# Patient Record
Sex: FEMALE | Race: OTHER | HISPANIC OR LATINO | URBAN - METROPOLITAN AREA
[De-identification: names, ages, dates, MRNs, and addresses within clinical notes are randomized per-mention and may not be internally consistent; named-entity substitution may affect disease eponyms.]

---

## 2020-01-05 ENCOUNTER — EMERGENCY (EMERGENCY)
Facility: HOSPITAL | Age: 65
LOS: 1 days | Discharge: HOME | End: 2020-01-05
Attending: EMERGENCY MEDICINE
Payer: SELF-PAY

## 2020-01-05 VITALS
SYSTOLIC BLOOD PRESSURE: 182 MMHG | OXYGEN SATURATION: 99 % | RESPIRATION RATE: 20 BRPM | DIASTOLIC BLOOD PRESSURE: 121 MMHG | TEMPERATURE: 98 F | HEART RATE: 97 BPM

## 2020-01-05 LAB
ANION GAP SERPL CALC-SCNC: 14 MMOL/L — SIGNIFICANT CHANGE UP (ref 5–17)
BUN SERPL-MCNC: 14 MG/DL — SIGNIFICANT CHANGE UP (ref 8–20)
CALCIUM SERPL-MCNC: 9.4 MG/DL — SIGNIFICANT CHANGE UP (ref 8.6–10.2)
CHLORIDE SERPL-SCNC: 101 MMOL/L — SIGNIFICANT CHANGE UP (ref 98–107)
CO2 SERPL-SCNC: 26 MMOL/L — SIGNIFICANT CHANGE UP (ref 22–29)
CREAT SERPL-MCNC: 0.83 MG/DL — SIGNIFICANT CHANGE UP (ref 0.5–1.3)
GLUCOSE SERPL-MCNC: 130 MG/DL — HIGH (ref 70–115)
HCT VFR BLD CALC: 40.8 % — SIGNIFICANT CHANGE UP (ref 34.5–45)
HGB BLD-MCNC: 13.3 G/DL — SIGNIFICANT CHANGE UP (ref 11.5–15.5)
MCHC RBC-ENTMCNC: 29.4 PG — SIGNIFICANT CHANGE UP (ref 27–34)
MCHC RBC-ENTMCNC: 32.6 GM/DL — SIGNIFICANT CHANGE UP (ref 32–36)
MCV RBC AUTO: 90.1 FL — SIGNIFICANT CHANGE UP (ref 80–100)
PLATELET # BLD AUTO: 257 K/UL — SIGNIFICANT CHANGE UP (ref 150–400)
POTASSIUM SERPL-MCNC: 3.4 MMOL/L — LOW (ref 3.5–5.3)
POTASSIUM SERPL-SCNC: 3.4 MMOL/L — LOW (ref 3.5–5.3)
RBC # BLD: 4.53 M/UL — SIGNIFICANT CHANGE UP (ref 3.8–5.2)
RBC # FLD: 12 % — SIGNIFICANT CHANGE UP (ref 10.3–14.5)
SODIUM SERPL-SCNC: 141 MMOL/L — SIGNIFICANT CHANGE UP (ref 135–145)
T3 SERPL-MCNC: 111 NG/DL — SIGNIFICANT CHANGE UP (ref 80–200)
T4 AB SER-ACNC: 6.1 UG/DL — SIGNIFICANT CHANGE UP (ref 4.5–12)
TROPONIN T SERPL-MCNC: <0.01 NG/ML — SIGNIFICANT CHANGE UP (ref 0–0.06)
TSH SERPL-MCNC: 2.75 UIU/ML — SIGNIFICANT CHANGE UP (ref 0.27–4.2)
WBC # BLD: 6.53 K/UL — SIGNIFICANT CHANGE UP (ref 3.8–10.5)
WBC # FLD AUTO: 6.53 K/UL — SIGNIFICANT CHANGE UP (ref 3.8–10.5)

## 2020-01-05 PROCEDURE — 74174 CTA ABD&PLVS W/CONTRAST: CPT | Mod: 26

## 2020-01-05 PROCEDURE — 93010 ELECTROCARDIOGRAM REPORT: CPT

## 2020-01-05 PROCEDURE — 99218: CPT

## 2020-01-05 PROCEDURE — 71275 CT ANGIOGRAPHY CHEST: CPT | Mod: 26

## 2020-01-05 PROCEDURE — 93010 ELECTROCARDIOGRAM REPORT: CPT | Mod: 77

## 2020-01-05 PROCEDURE — 71046 X-RAY EXAM CHEST 2 VIEWS: CPT | Mod: 26

## 2020-01-05 RX ORDER — IBUPROFEN 200 MG
600 TABLET ORAL ONCE
Refills: 0 | Status: COMPLETED | OUTPATIENT
Start: 2020-01-05 | End: 2020-01-05

## 2020-01-05 RX ORDER — AMLODIPINE BESYLATE 2.5 MG/1
10 TABLET ORAL ONCE
Refills: 0 | Status: COMPLETED | OUTPATIENT
Start: 2020-01-05 | End: 2020-01-05

## 2020-01-05 RX ADMIN — AMLODIPINE BESYLATE 10 MILLIGRAM(S): 2.5 TABLET ORAL at 15:44

## 2020-01-05 RX ADMIN — Medication 600 MILLIGRAM(S): at 18:15

## 2020-01-05 RX ADMIN — Medication 600 MILLIGRAM(S): at 15:44

## 2020-01-05 NOTE — ED PROVIDER NOTE - CLINICAL SUMMARY MEDICAL DECISION MAKING FREE TEXT BOX
Pt with palpitations and HTN. Has had symptoms multiple times in past. Currently not seeing cardiology due to insurance issue. Plan for labs, trop, cxr, ecg, amlodipine, reassess. Consider SS cards consult. Pt with palpitations and HTN. Has had symptoms multiple times in past. Currently not seeing cardiology due to insurance issue. Plan for labs, trop, cxr, ecg, amlodipine, reassess. Consider CDU

## 2020-01-05 NOTE — ED CDU PROVIDER INITIAL DAY NOTE - OBJECTIVE STATEMENT
64F h/o HTN (unknown meds) p/w palpitations since last night, constant. Was told in EMS that she was hypertensive. Has been having palpitations and HTN x 3 years, states she had negative workups. Negative echo a year ago, no stress test. Denies CP, SOB, travel, leg pain, leg swelling, urinary symptoms, difficulty urinating. Is compliant with medications.

## 2020-01-05 NOTE — PROGRESS NOTE ADULT - SUBJECTIVE AND OBJECTIVE BOX
Advised CDU team to obtain TTE. Continue norvasc. If needed for blood pressure control, add lisinopril 10 mg.   If normal, dc tomorrow.

## 2020-01-05 NOTE — ED CDU PROVIDER INITIAL DAY NOTE - NS ED ROS FT
No fever/chills, No photophobia/eye pain/changes in vision, No ear pain/sore throat/dysphagia, +chest pain/+palpitations, no SOB/cough/wheeze/stridor, No abdominal pain, No N/V/D, no dysuria/frequency/discharge, No neck/back pain, no rash, no changes in neurological status/function.

## 2020-01-05 NOTE — ED PROVIDER NOTE - OBJECTIVE STATEMENT
Translation provided by ED : Nette Sher  64F h/o HTN (unknown meds) p/w palpitations since last night, constant. Was told in EMS that she was hypertensive. Has been having palpitations and HTN x 3 years, states she had negative workups. Negative echo a year ago, no stress test. Denies CP, SOB, travel, leg pain, leg swelling, urinary symptoms, difficulty urinating. Is compliant with medications.

## 2020-01-05 NOTE — ED PROVIDER NOTE - PHYSICAL EXAMINATION
Gen: Well appearing in NAD  Head: NC/AT  Neck: trachea midline  Resp:  No distress, CTAB  CV: RRR, no edema  GI: soft, NTND  Ext: no deformities, no calf ttp  Neuro:  A&O appears non focal  Skin:  Warm and dry as visualized  Psych:  Normal affect and mood

## 2020-01-05 NOTE — ED CDU PROVIDER INITIAL DAY NOTE - ATTENDING CONTRIBUTION TO CARE
I, Shahnaz Mejia, performed a face to face bedside interview with this patient regarding history of present illness, review of symptoms and relevant past medical, social and family history.  I completed an independent physical examination. Medical decision making, follow-up on ordered tests (ie labs, radiologic studies) and re-evaluation of the patient's status has been communicated to the ACP.  Disposition of the patient will be based on test outcome and response to ED interventions.     Pt with h/o HTN, unsure medication, came in today with palpitations, no CP/SOB. no LE edema. BP controlled with PO norvasc, CXR with tortuous aorta/widened mediastinum, with severe HTN CTA ordered for dissection though patient neuro intact/normal vascular exam, low suspicion. to be seen by cards in AM. serial trops and echo.

## 2020-01-05 NOTE — ED CDU PROVIDER INITIAL DAY NOTE - PROGRESS NOTE DETAILS
Given risk factors age, and HTN and patient has no medical insurance consulted [facility] cardiology.  CXR reviewed given prominent aortic knob will obtain CTA to evaluate for dissection spoke to cardiology Daysi reccomends echo and possible second agent to control BP ACE or ARB (10 of Lisinopril). case d/w PA MILLWORM to f/u CTA results Pt seen resting comfortable in no acute distress in bed, no acute complaints will follow CDU initial intake orders observe for change in pt condition, results and or consults. Pt seen resting comfortable in no acute distress in bed, no acute complaints will follow CDU initial intake orders observe for change in pt condition, results and or consults.  PT with BP lowered approximately 20-30 percent will hold second agent and await further testing.

## 2020-01-05 NOTE — ED CDU PROVIDER INITIAL DAY NOTE - MEDICAL DECISION MAKING DETAILS
palpitations/elevated BP: heart score 3, will place in observation for cardiac pathway, serial troponin,  will add on thyroid pannel, EKG, CTA to eval for dissection, cardiology evaluation, re-assess

## 2020-01-06 VITALS
DIASTOLIC BLOOD PRESSURE: 84 MMHG | OXYGEN SATURATION: 96 % | HEART RATE: 65 BPM | SYSTOLIC BLOOD PRESSURE: 135 MMHG | TEMPERATURE: 98 F | RESPIRATION RATE: 16 BRPM

## 2020-01-06 PROCEDURE — 93005 ELECTROCARDIOGRAM TRACING: CPT

## 2020-01-06 PROCEDURE — 74174 CTA ABD&PLVS W/CONTRAST: CPT

## 2020-01-06 PROCEDURE — 36415 COLL VENOUS BLD VENIPUNCTURE: CPT

## 2020-01-06 PROCEDURE — 99217: CPT

## 2020-01-06 PROCEDURE — 84436 ASSAY OF TOTAL THYROXINE: CPT

## 2020-01-06 PROCEDURE — 80048 BASIC METABOLIC PNL TOTAL CA: CPT

## 2020-01-06 PROCEDURE — G0378: CPT

## 2020-01-06 PROCEDURE — 99284 EMERGENCY DEPT VISIT MOD MDM: CPT | Mod: 25

## 2020-01-06 PROCEDURE — 71275 CT ANGIOGRAPHY CHEST: CPT

## 2020-01-06 PROCEDURE — T1013: CPT

## 2020-01-06 PROCEDURE — 93306 TTE W/DOPPLER COMPLETE: CPT | Mod: 26

## 2020-01-06 PROCEDURE — 70450 CT HEAD/BRAIN W/O DYE: CPT | Mod: 26

## 2020-01-06 PROCEDURE — 84480 ASSAY TRIIODOTHYRONINE (T3): CPT

## 2020-01-06 PROCEDURE — 84443 ASSAY THYROID STIM HORMONE: CPT

## 2020-01-06 PROCEDURE — 70450 CT HEAD/BRAIN W/O DYE: CPT

## 2020-01-06 PROCEDURE — 85027 COMPLETE CBC AUTOMATED: CPT

## 2020-01-06 PROCEDURE — 93306 TTE W/DOPPLER COMPLETE: CPT

## 2020-01-06 PROCEDURE — 71046 X-RAY EXAM CHEST 2 VIEWS: CPT

## 2020-01-06 PROCEDURE — 84484 ASSAY OF TROPONIN QUANT: CPT

## 2020-01-06 RX ORDER — AMLODIPINE BESYLATE 2.5 MG/1
10 TABLET ORAL ONCE
Refills: 0 | Status: COMPLETED | OUTPATIENT
Start: 2020-01-06 | End: 2020-01-06

## 2020-01-06 RX ORDER — AMLODIPINE BESYLATE 2.5 MG/1
1 TABLET ORAL
Qty: 14 | Refills: 0
Start: 2020-01-06 | End: 2020-01-19

## 2020-01-06 RX ORDER — ACETAMINOPHEN 500 MG
650 TABLET ORAL ONCE
Refills: 0 | Status: COMPLETED | OUTPATIENT
Start: 2020-01-06 | End: 2020-01-06

## 2020-01-06 RX ADMIN — Medication 650 MILLIGRAM(S): at 07:22

## 2020-01-06 RX ADMIN — AMLODIPINE BESYLATE 10 MILLIGRAM(S): 2.5 TABLET ORAL at 11:40

## 2020-01-06 RX ADMIN — Medication 650 MILLIGRAM(S): at 06:39

## 2020-01-06 NOTE — ED ADULT NURSE REASSESSMENT NOTE - GENERAL PATIENT STATE
family/SO at bedside/comfortable appearance/cooperative/resting/sleeping/smiling/interactive
comfortable appearance/resting/sleeping/smiling/interactive/cooperative
smiling/interactive/cooperative/family/SO at bedside/comfortable appearance/resting/sleeping

## 2020-01-06 NOTE — ED CDU PROVIDER SUBSEQUENT DAY NOTE - PROGRESS NOTE DETAILS
Pt received from MARI Shanks, pending TTE. Pt evaluated at bedside, reports no headache at this time. States she had headache in morning, now resolved. No palpitations or chest pain. Will order CT head given HTN and headache, TTE pending. Pt received from MARI Shanks, pending TTE. Pt evaluated at bedside with Dr. Givens, reports no headache at this time. States she had headache in morning, now resolved. No palpitations or chest pain. Will order CT head given HTN and headache, TTE pending. Spoke to pharmacy, pt only on HCTZ 25mg, d/w Dr. Givens, will send Rx Norvasc 10mg QD. MARI Spencer NOTE: Reviewed all results with Dr. Givens, will send Rx Norvasc 10mg QD, outpatient follow up at Neurosurgery and Cardioogy. Case management to arrange [facility]/Cards appt. Pt stable for d/c, reports no HA/CP/SOB. VSS, tolerating PO, ambulatory.  Discussion includes results, plan, proper medication use/side effects, and return precautions. Pt advised to f/u with PMD 1-2 days and specialists discussed.  Discussed all incidental findings and importance of outpatient follow up. Pt given printed copies of lab/radiology for outpt follow up. Pt verbalized understanding/agreement of plan. ED  Mily.

## 2020-01-06 NOTE — ED CDU PROVIDER DISPOSITION NOTE - NSFOLLOWUPCLINICS_GEN_ALL_ED_FT
[facility]  Cardiology  [address] [place] [address]  Phone: [phone]  Fax:   Follow Up Time: 7-10 Days

## 2020-01-06 NOTE — ED CDU PROVIDER DISPOSITION NOTE - CLINICAL COURSE
64F h/o HTN (unknown meds) p/w palpitations, HTN and headache. Had troponin negative x 3. Seen by Cardiology, recommending TTE, was WNL. Cardiology recommending adding Norvasc 10mg QD for BP control, pt is only on HCTZ 25mg at home. CT head reveals Mild cerebellar tonsillar ectopia, pt HA now resolved, will advise outpatient Neurosurgery follow up. Follow up outpatient with both Neurosurgery and Cardiology.

## 2020-01-06 NOTE — PROVIDER CONTACT NOTE (OTHER) - ASSESSMENT
Patient remained on observation in the CDU and was discharged to home today. Patient works full time in a factory. Patient lives with supportive friends and also has the support of family members . Patient's aby Amin at patient bedside interpreting Kittitian/ English at patient request. Patient declines interpretor services.  Patient is interested in medical follow up at the [facility] . Case Management office notified. Appointment secured for January 9, 2020 @ 1:30 PM with Dr Chikis Pacheco. Patient will also need to follow up with [facility]. Appointment at the Cardiology clinic has been scheduled for January 15,2020 @ 10:00 AM ( [phone]). Patient / niece Quinnexis have been given this information with verbal understanding. Patient's bay Morin will transport patient home.

## 2020-01-06 NOTE — DISCHARGE NOTE NURSING/CASE MANAGEMENT/SOCIAL WORK - PATIENT PORTAL LINK FT
You can access the FollowMyHealth Patient Portal offered by [facility] by registering at the following website: [email or web address]. By joining [employer]’s FollowMyHealth portal, you will also be able to view your health information using other applications (apps) compatible with our system.

## 2020-01-06 NOTE — DISCHARGE NOTE NURSING/CASE MANAGEMENT/SOCIAL WORK - NSDCFUADDAPPT_GEN_ALL_CORE_FT
[facility] Appointment secured for January 9, 2020 @ 1:30 PM with Dr Chikis Pacheco. Patient also has a follow up appt with [facility]. Appointment at the Cardiology clinic has been scheduled for January 15,2020 @ 10:00 AM ( [phone]). Patient / niece Quinnexis have been given this information with verbal understanding.

## 2020-01-06 NOTE — ED CDU PROVIDER SUBSEQUENT DAY NOTE - HISTORY
PT with Hypertensive urgency placed in obs for ECHO, CARDs consult, telemetry over night, BP controlled, pt with no acute incidents over night will await further testing and results.

## 2020-01-06 NOTE — ED CDU PROVIDER SUBSEQUENT DAY NOTE - ATTENDING CONTRIBUTION TO CARE
I personally saw the patient with the PA, and completed the key components of the history and physical exam. I then discussed the management plan with the PA.    Pt feeling well this morning;  reports intermittent mild frontal headaches/ informed of CTh results and echo pending.

## 2020-01-06 NOTE — ED ADULT NURSE REASSESSMENT NOTE - NS ED NURSE REASSESS COMMENT FT1
Endorsed pt care from  previous RN Armando العلي. Pt off unit at CT.
Pt alert and oriented. resting in stretcher, no signs of distress noted. Handoff report given to Armando العلي RN. pt's safety maintained. RN with no questions or concerns at this time
Received report from CALI Bal. Pt to be moved to CDU for observation.
pt ambulated to/from bathroom with steady gait. now with complaints of headache. denies any vision changes. MARI Shanks made aware, awaiting orders at this time.
pt   predominately Angolan speaking. ED language services offered. pt decline, requesting family to translate. A&OX4, resting in stretcher, denies any c/o pain/discomfort. no signs of apparent distress noted at this time. awaiting CT results/ TTE echo.  Plan of care reviewed, pt verbalizes (+) understanding.  bed in lowest position, call bell within reach and safety maintained

## 2020-01-06 NOTE — ED ADULT NURSE REASSESSMENT NOTE - COMFORT CARE
wait time explained/plan of care explained/ambulated to bathroom
meal provided/po fluids offered/wait time explained/ambulated to bathroom/plan of care explained
plan of care explained

## 2020-01-06 NOTE — ED CDU PROVIDER DISPOSITION NOTE - ATTENDING CONTRIBUTION TO CARE
I personally saw the patient with the PA, and completed the key components of the history and physical exam. I then discussed the management plan with the PA.    Pt feeling well. GIven results and f/u information

## 2020-01-06 NOTE — ED CDU PROVIDER DISPOSITION NOTE - PATIENT PORTAL LINK FT
You can access the FollowMyHealth Patient Portal offered by [facility] by registering at the following website: [email or web address]. By joining [employer]’s FollowMyHealth portal, you will also be able to view your health information using other applications (apps) compatible with our system.

## 2020-01-06 NOTE — ED CDU PROVIDER DISPOSITION NOTE - NSFOLLOWUPINSTRUCTIONS_ED_ALL_ED_FT
Please follow up with your Primary doctor in 24-48 hr.  Seek immediate medical care for any new and/or worsening signs or symptoms.   Take medications as directed  Follow up with Neurosurgeon and Cardiologist in 1-2 weeks    Palpitations  A palpitation is the feeling that your heartbeat is irregular or is faster than normal. It may feel like your heart is fluttering or skipping a beat. They may be caused by many things, including smoking, caffeine, alcohol, stress, and certain medicines. Although most causes of palpitations are not serious, palpitations can be a sign of a serious medical problem. Avoid caffeine, alcohol, and tobacco products at home. Try to reduce stress and anxiety and make sure to get plenty of rest.   ______________________________________________________________________________________________________________  SEEK IMMEDIATE MEDICAL CARE IF YOU HAVE ANY OF THE FOLLOWING SYMPTOMS: chest pain, shortness of breath, severe headache, dizziness/lightheadedness, or fainting.     Hypertension, commonly called high blood pressure, is when the force of blood pumping through your arteries is too strong. Hypertension forces your heart to work harder to pump blood. Your arteries may become narrow or stiff. Having untreated or uncontrolled hypertension for a long period of time can cause heart attack, stroke, kidney disease, and other problems. If started on a medication, take exactly as prescribed by your health care professional. Maintain a healthy lifestyle and follow up with your primary care physician.    SEEK IMMEDIATE MEDICAL CARE IF YOU HAVE ANY OF THE FOLLOWING SYMPTOMS: severe headache, confusion, chest pain, abdominal pain, vomiting, or shortness of breath.

## 2020-01-06 NOTE — ED CDU PROVIDER DISPOSITION NOTE - CARE PROVIDER_API CALL
Delvin Read)  Neurosurgery  [facility] [address]  [place] [address]  Phone: [phone]  Fax: [phone]  Follow Up Time: 7-10 Days

## 2020-01-10 PROBLEM — I10 ESSENTIAL (PRIMARY) HYPERTENSION: Chronic | Status: ACTIVE | Noted: 2020-01-05

## 2020-01-13 PROBLEM — Z00.00 ENCOUNTER FOR PREVENTIVE HEALTH EXAMINATION: Status: ACTIVE | Noted: 2020-01-13

## 2020-01-15 ENCOUNTER — OUTPATIENT (OUTPATIENT)
Dept: OUTPATIENT SERVICES | Facility: HOSPITAL | Age: 65
LOS: 1 days | End: 2020-01-15
Payer: SELF-PAY

## 2020-01-15 ENCOUNTER — APPOINTMENT (OUTPATIENT)
Dept: CARDIOLOGY | Facility: CLINIC | Age: 65
End: 2020-01-15

## 2020-01-15 VITALS
DIASTOLIC BLOOD PRESSURE: 83 MMHG | BODY MASS INDEX: 30.65 KG/M2 | HEIGHT: 63 IN | SYSTOLIC BLOOD PRESSURE: 133 MMHG | WEIGHT: 173 LBS | RESPIRATION RATE: 14 BRPM | HEART RATE: 84 BPM

## 2020-01-15 DIAGNOSIS — I10 ESSENTIAL (PRIMARY) HYPERTENSION: ICD-10-CM

## 2020-01-15 DIAGNOSIS — I25.10 ATHEROSCLEROTIC HEART DISEASE OF NATIVE CORONARY ARTERY WITHOUT ANGINA PECTORIS: ICD-10-CM

## 2020-01-15 DIAGNOSIS — R00.2 PALPITATIONS: ICD-10-CM

## 2020-01-15 PROCEDURE — G0463: CPT

## 2020-01-15 RX ORDER — AMLODIPINE BESYLATE 10 MG/1
10 TABLET ORAL DAILY
Qty: 30 | Refills: 3 | Status: ACTIVE | COMMUNITY
Start: 2020-01-15 | End: 1900-01-01

## 2020-02-26 ENCOUNTER — APPOINTMENT (OUTPATIENT)
Dept: CARDIOLOGY | Facility: CLINIC | Age: 65
End: 2020-02-26

## 2020-02-26 DIAGNOSIS — Z82.49 FAMILY HISTORY OF ISCHEMIC HEART DISEASE AND OTHER DISEASES OF THE CIRCULATORY SYSTEM: ICD-10-CM

## 2020-02-26 DIAGNOSIS — Z78.9 OTHER SPECIFIED HEALTH STATUS: ICD-10-CM

## 2020-06-16 ENCOUNTER — RX RENEWAL (OUTPATIENT)
Age: 65
End: 2020-06-16

## 2020-06-16 RX ORDER — METOPROLOL SUCCINATE 25 MG/1
25 TABLET, EXTENDED RELEASE ORAL DAILY
Qty: 30 | Refills: 1 | Status: ACTIVE | COMMUNITY
Start: 2020-01-15 | End: 1900-01-01
